# Patient Record
(demographics unavailable — no encounter records)

---

## 2024-11-15 NOTE — HISTORY OF PRESENT ILLNESS
[de-identified] : Ms. Jenny Rosas is 52 year old female with cervical cancer here for consultation. \par  Accompanied by her  Terrell\par  \par  Patient with no PMHx who had irregular periods  over the last year who went to see her GYN Dr. Saucedo and found cervical mass (last GYN visit was 10  yrs ago).  Patient had episodes of vaginal bleeding after her having BM that lasted for a few days, went into urgent care (no vaginal PE) in early 2023. \par  \par  She has 40 lbs intentional weight loss due to diet changes. \par  \par  6/15/2023 \par  A. CERVICAL MASS BIOPSY  :\par  - Superficial fragments of adenocarcinoma with papillary/micropapillary morphology, favor cervical origin.\par  - See comment  \par  Diagnosis Comment \par  There has been intradepartmental review of this case with agreement.\par  The case has been presented during daily GYN consensus meeting on 23.\par  Immunohistochemical stains performed on block A1 and showed tumor cell to be positive for CK7, p16, and mCEA. P53 showed wild type staining. Vimentin, ER and AL are negative. \par  This immunohistochemical staining pattern support the above diagnosis.\par  \par  2023 Pet/CT scan - result\par  Pelvis MRI  - done but not read\par  \par  FHx:\par  Mother with lung cancer (never smoked) at age 58 - \par  P. aunt with breast cancer in her 60s (alive)\par  Father with prostate cancer ins his 60s - alive\par  PGM with multiple myeloma -  \par  Sister with uterine leiomyoma - alive\par  \par  Colonoscopy - never had one\par  mammogram - over due, has another prescriptino\par  \par  Social History\par  Smoked in her 20s for 3-4 yrs\par  Alcohol - denies\par  Illicit drugs - denies\par  Works for  \par  \par  She still has some vaginal spotting. \par  She also has cramping pain on lower pelvic - takes Ibuprofen\par  .\par  \par   [de-identified] : Patient is seen today for follow up   Overall since we last saw patient, patient states she feels "fine." Coccyx pain is still there but manageable. Gets physical therapy x 7 months Saw Dr MACDONALD in July 2024 and now scheduled to see in January 2025 Denies hearing loss, tinnitus Has been having nose bleeds x 6 months. Upon enquiry, mostly on left  Recommend to see ENT

## 2024-11-15 NOTE — BEGINNING OF VISIT
[0] : 2) Feeling down, depressed, or hopeless: Not at all (0) [PHQ-2 Negative] : PHQ-2 Negative [PHQ-9 Negative] : PHQ-9 Negative [Pain Scale: ___] : On a scale of 1-10, today the patient's pain is a(n) [unfilled]. [Patient/Caregiver unclear of wishes] : Patient/Caregiver unclear of wishes [[id number]] : 0

## 2024-11-15 NOTE — ASSESSMENT
[FreeTextEntry1] : ## Cervical Adenocarcinoma p16+ Clinical Stage IIA (MRI)/ IIB (On Exam) PET/CT (23)- 7.0 cm hypermetabolic cervical mass, SUV max 16.2, extending into the upper vaginal canal, consistent with malignancy. No evidence of FDG avid metastatic disease. MRI (2023)- Cervical tumor (6 cm) with bulk of the tumor centered in ectocervix, extending and probably involving upper vagina. Routine limitations of the scan, no parametrial invasion. Suspicious right external iliac node. -hearing test done s/p cisplatin weekly #6 (23 - 23) Radiation (2023 - 2023 - total 25 rounds external and 4 internal 2023 MRI - significant decreased bulk of 3.6 cm cervical mass with suspected upper vaginal invasion. --Decreased size of previously noted suspicious subcm b/l external iliac LNs. 11/15/2023 Pet/Ct  - ZOE 2023 MRI of pelvis -  nonspecific periosseous edema within the abdon coccygeal soft tissue anteriorly, related to post treatment changes.  Patient is here for follow up continues to have coccyx pain - but manageable Labs ordered, drawn in the office, reviewed, analyzed and discussed Hgb, WBC, platelets all back to normal No hearing loss or tinnitus Continue to follow up with Dr. Lomas.  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 She has informed her family   Social Hx Lives in [place], [place] with  ([employer]) and daughter  at [employer] Smoked in her 20s for a few years.  FHx: Mother with lung cancer (never smoked) at age 58 -  P. aunt with breast cancer in her 60s (alive) Father with prostate cancer ins his 60s - alive PGM with multiple myeloma -  Sister with uterine leiomyoma - alive Strongly advised to be uptodate with age appropriate cancer screening  Patient had multiple questions which were answered to satisfaction  Advised patient to monitor with the care team including Gyn Onc, PCP . Parameters including symptoms, signs and labs to monitor discussed at length. Advised to follow up PRN, Contact information given. Patient agrees with complete understanding.

## 2024-11-15 NOTE — BEGINNING OF VISIT
[0] : 2) Feeling down, depressed, or hopeless: Not at all (0) [PHQ-2 Negative] : PHQ-2 Negative [PHQ-9 Negative] : PHQ-9 Negative [Pain Scale: ___] : On a scale of 1-10, today the patient's pain is a(n) [unfilled]. [Patient/Caregiver unclear of wishes] : Patient/Caregiver unclear of wishes [[id number]] : 0

## 2024-11-15 NOTE — HISTORY OF PRESENT ILLNESS
[de-identified] : Ms. Jenny Rosas is 52 year old female with cervical cancer here for consultation. \par  Accompanied by her  Terrell\par  \par  Patient with no PMHx who had irregular periods  over the last year who went to see her GYN Dr. Saucedo and found cervical mass (last GYN visit was 10  yrs ago).  Patient had episodes of vaginal bleeding after her having BM that lasted for a few days, went into urgent care (no vaginal PE) in early 2023. \par  \par  She has 40 lbs intentional weight loss due to diet changes. \par  \par  6/15/2023 \par  A. CERVICAL MASS BIOPSY  :\par  - Superficial fragments of adenocarcinoma with papillary/micropapillary morphology, favor cervical origin.\par  - See comment  \par  Diagnosis Comment \par  There has been intradepartmental review of this case with agreement.\par  The case has been presented during daily GYN consensus meeting on 23.\par  Immunohistochemical stains performed on block A1 and showed tumor cell to be positive for CK7, p16, and mCEA. P53 showed wild type staining. Vimentin, ER and SC are negative. \par  This immunohistochemical staining pattern support the above diagnosis.\par  \par  2023 Pet/CT scan - result\par  Pelvis MRI  - done but not read\par  \par  FHx:\par  Mother with lung cancer (never smoked) at age 58 - \par  P. aunt with breast cancer in her 60s (alive)\par  Father with prostate cancer ins his 60s - alive\par  PGM with multiple myeloma -  \par  Sister with uterine leiomyoma - alive\par  \par  Colonoscopy - never had one\par  mammogram - over due, has another prescriptino\par  \par  Social History\par  Smoked in her 20s for 3-4 yrs\par  Alcohol - denies\par  Illicit drugs - denies\par  Works for  \par  \par  She still has some vaginal spotting. \par  She also has cramping pain on lower pelvic - takes Ibuprofen\par  .\par  \par   [de-identified] : Patient is seen today for follow up   Overall since we last saw patient, patient states she feels "fine." Coccyx pain is still there but manageable. Gets physical therapy x 7 months Saw Dr MACDONALD in July 2024 and now scheduled to see in January 2025 Denies hearing loss, tinnitus Has been having nose bleeds x 6 months. Upon enquiry, mostly on left  Recommend to see ENT

## 2024-11-15 NOTE — ASSESSMENT
[FreeTextEntry1] : ## Cervical Adenocarcinoma p16+ Clinical Stage IIA (MRI)/ IIB (On Exam) PET/CT (23)- 7.0 cm hypermetabolic cervical mass, SUV max 16.2, extending into the upper vaginal canal, consistent with malignancy. No evidence of FDG avid metastatic disease. MRI (2023)- Cervical tumor (6 cm) with bulk of the tumor centered in ectocervix, extending and probably involving upper vagina. Routine limitations of the scan, no parametrial invasion. Suspicious right external iliac node. -hearing test done s/p cisplatin weekly #6 (23 - 23) Radiation (2023 - 2023 - total 25 rounds external and 4 internal 2023 MRI - significant decreased bulk of 3.6 cm cervical mass with suspected upper vaginal invasion. --Decreased size of previously noted suspicious subcm b/l external iliac LNs. 11/15/2023 Pet/Ct  - ZOE 2023 MRI of pelvis -  nonspecific periosseous edema within the abdon coccygeal soft tissue anteriorly, related to post treatment changes.  Patient is here for follow up continues to have coccyx pain - but manageable Labs ordered, drawn in the office, reviewed, analyzed and discussed Hgb, WBC, platelets all back to normal No hearing loss or tinnitus Continue to follow up with Dr. Lomas.  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 She has informed her family   Social Hx Lives in [place], [place] with  ([employer]) and daughter  at [employer] [facility] [employer] Smoked in her 20s for a few years.  FHx: Mother with lung cancer (never smoked) at age 58 -  P. aunt with breast cancer in her 60s (alive) Father with prostate cancer ins his 60s - alive PGM with multiple myeloma -  Sister with uterine leiomyoma - alive Strongly advised to be uptodate with age appropriate cancer screening  Patient had multiple questions which were answered to satisfaction  Advised patient to monitor with the care team including Gyn Onc, PCP . Parameters including symptoms, signs and labs to monitor discussed at length. Advised to follow up PRN, Contact information given. Patient agrees with complete understanding.

## 2024-11-15 NOTE — ASSESSMENT
[FreeTextEntry1] : ## Cervical Adenocarcinoma p16+ Clinical Stage IIA (MRI)/ IIB (On Exam) PET/CT (23)- 7.0 cm hypermetabolic cervical mass, SUV max 16.2, extending into the upper vaginal canal, consistent with malignancy. No evidence of FDG avid metastatic disease. MRI (2023)- Cervical tumor (6 cm) with bulk of the tumor centered in ectocervix, extending and probably involving upper vagina. Routine limitations of the scan, no parametrial invasion. Suspicious right external iliac node. -hearing test done s/p cisplatin weekly #6 (23 - 23) Radiation (2023 - 2023 - total 25 rounds external and 4 internal 2023 MRI - significant decreased bulk of 3.6 cm cervical mass with suspected upper vaginal invasion. --Decreased size of previously noted suspicious subcm b/l external iliac LNs. 11/15/2023 Pet/Ct  - ZOE 2023 MRI of pelvis -  nonspecific periosseous edema within the abdon coccygeal soft tissue anteriorly, related to post treatment changes.  Patient is here for follow up continues to have coccyx pain - but manageable Labs ordered, drawn in the office, reviewed, analyzed and discussed Hgb, WBC, platelets all back to normal No hearing loss or tinnitus Continue to follow up with Dr. Lomas.  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 She has informed her family   Social Hx Lives in [place], [place] with  ([employer]) and daughter  at [employer] [facility] [employer] Smoked in her 20s for a few years.  FHx: Mother with lung cancer (never smoked) at age 58 -  P. aunt with breast cancer in her 60s (alive) Father with prostate cancer ins his 60s - alive PGM with multiple myeloma -  Sister with uterine leiomyoma - alive Strongly advised to be uptodate with age appropriate cancer screening  Patient had multiple questions which were answered to satisfaction  Advised patient to monitor with the care team including Gyn Onc, PCP . Parameters including symptoms, signs and labs to monitor discussed at length. Advised to follow up PRN, Contact information given. Patient agrees with complete understanding.

## 2024-11-15 NOTE — REVIEW OF SYSTEMS
[FreeTextEntry2] : 10 point review of systems negative except as outlined in HPI [FreeTextEntry9] : +low back pain

## 2024-11-15 NOTE — BEGINNING OF VISIT
[0] : 2) Feeling down, depressed, or hopeless: Not at all (0) [PHQ-2 Negative] : PHQ-2 Negative [PHQ-9 Negative] : PHQ-9 Negative [Pain Scale: ___] : On a scale of 1-10, today the patient's pain is a(n) [unfilled]. [Patient/Caregiver unclear of wishes] : Patient/Caregiver unclear of wishes [[id number]] : 0

## 2024-11-15 NOTE — REVIEW OF SYSTEMS
[FreeTextEntry9] : +low back pain [FreeTextEntry2] : 10 point review of systems negative except as outlined in HPI

## 2024-11-15 NOTE — HISTORY OF PRESENT ILLNESS
[de-identified] : Ms. Jenny Rosas is 52 year old female with cervical cancer here for consultation. \par  Accompanied by her  Terrell\par  \par  Patient with no PMHx who had irregular periods  over the last year who went to see her GYN Dr. Saucedo and found cervical mass (last GYN visit was 10  yrs ago).  Patient had episodes of vaginal bleeding after her having BM that lasted for a few days, went into urgent care (no vaginal PE) in early 2023. \par  \par  She has 40 lbs intentional weight loss due to diet changes. \par  \par  6/15/2023 \par  A. CERVICAL MASS BIOPSY  :\par  - Superficial fragments of adenocarcinoma with papillary/micropapillary morphology, favor cervical origin.\par  - See comment  \par  Diagnosis Comment \par  There has been intradepartmental review of this case with agreement.\par  The case has been presented during daily GYN consensus meeting on 23.\par  Immunohistochemical stains performed on block A1 and showed tumor cell to be positive for CK7, p16, and mCEA. P53 showed wild type staining. Vimentin, ER and NE are negative. \par  This immunohistochemical staining pattern support the above diagnosis.\par  \par  2023 Pet/CT scan - result\par  Pelvis MRI  - done but not read\par  \par  FHx:\par  Mother with lung cancer (never smoked) at age 58 - \par  P. aunt with breast cancer in her 60s (alive)\par  Father with prostate cancer ins his 60s - alive\par  PGM with multiple myeloma -  \par  Sister with uterine leiomyoma - alive\par  \par  Colonoscopy - never had one\par  mammogram - over due, has another prescriptino\par  \par  Social History\par  Smoked in her 20s for 3-4 yrs\par  Alcohol - denies\par  Illicit drugs - denies\par  Works for  \par  \par  She still has some vaginal spotting. \par  She also has cramping pain on lower pelvic - takes Ibuprofen\par  .\par  \par   [de-identified] : Patient is seen today for follow up   Overall since we last saw patient, patient states she feels "fine." Coccyx pain is still there but manageable. Gets physical therapy x 7 months Saw Dr MACDONALD in July 2024 and now scheduled to see in January 2025 Denies hearing loss, tinnitus Has been having nose bleeds x 6 months. Upon enquiry, mostly on left  Recommend to see ENT

## 2025-01-09 NOTE — VITALS
[Maximal Pain Intensity: 9/10] : 9/10 [Least Pain Intensity: 0/10] : 0/10 [Pain Duration: ___] : Pain duration: [unfilled] [Pain Location: ___] : Pain Location: [unfilled] [Pain Interferes with ADLs] : Pain interferes with activities of daily living. [OTC] : OTC [90: Able to carry normal activity; minor signs or symptoms of disease.] : 90: Able to carry normal activity; minor signs or symptoms of disease.  [Maximal Pain Intensity: 0/10] : 0/10 [Pain Description/Quality: ___] : Pain description/quality: [unfilled]

## 2025-01-14 NOTE — HISTORY OF PRESENT ILLNESS
[FreeTextEntry1] : Mrs. 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 She is present for her routine follow-up.   Mrs. Rosas  underwent a cervical biopsy 6/15/2023 which demonstrated superficial fragments of adenocarcinoma with papillary/micropapillary morphology, favoring cervical origin. Immunohistochemical stain showed tumor cell to be positive for CK7, p16, and mCEA. P53 showed wild type staining. Vimentin, ER and TN were negative.  PET CT performed on 6/19/23 demonstrated FDG avid uptake in a 7cm cervical mass with an SUV of 16.2 extending into the upper vaginal canal consistent with malignancy. There was no evidence of metastatic disease. There was a subcentimeter nodule in the right middle lobe that was too small to characterize.  MRI of the pelvis was performed on 6/19/23, findings revealed: a T2 intermediate tumor in the cervix with diffusion restriction extending down from internal os to upper vagina, 6.0 cm. Tumor may involve right upper posterior vaginal wall. Within limitation of the scan, no parametrial invasion. LYMPH NODES: Suspicious right posterior external iliac nodes (7, 9), 0.8 x 0.6 cm Nonspecific left posterior external iliac node, 0.8 x 0.5 cm.  Mrs. Rosas was treated with definitive chemoradiation.  She completed radiation to the pelvis to a dose of 4500 cGy and also completed 4 tandem and ring insertions for a total dose of 28Gy on 8/29/23. Mrs. Rosas tolerated treatment well with minimal acute side effects aside from fatigue and urinary irritation.   -PETCT 11/15/23: No evidence of FDG avid malignancy. Mild focal uptake overlying the urethra meatus and vaginal introitus most likely represents urinary contamination. Correlate clinically. MRI 11/15/23:1. Cervical tumor is no longer visualized. 2. No pelvic adenopathy. -MRI 12/19/23: Confluent presacral edema likely related to posttreatment change. Nonspecific periosseous edema within the pericoccygeal soft tissues anteriorly. This may be related to posttreatment changes or osseous stress reaction. Follow-up imaging is recommended to confirm resolution.   Mrs. Rosas is present for her routine 1 year 5 months follow-up s/p completion of radiation therapy.   She denies any vaginal irritation, discharge, bleeding. She reports more recently double voiding and is awaiting UA results from PMD. She denies any bowel symptoms.  She uses the vaginal dilator occasion weekly.  She is sexually active regularly and denies discomfort with intercourse.  She discontinued vaginal estrogen tablets.  She has follow-up scheduled with Dr. Angel Ogden on 1/31/25.  She is currently in PT for lower back discomfort and she feels it is helping her back pain.

## 2025-01-14 NOTE — PHYSICAL EXAM
[Normal] : oriented to person, place and time, the affect was normal, the mood was normal and not anxious [de-identified] : flush cervix, no visible masses, shortened vagina

## 2025-01-14 NOTE — HISTORY OF PRESENT ILLNESS
[FreeTextEntry1] : Mrs. 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 She is present for her routine follow-up.   Mrs. Rosas  underwent a cervical biopsy 6/15/2023 which demonstrated superficial fragments of adenocarcinoma with papillary/micropapillary morphology, favoring cervical origin. Immunohistochemical stain showed tumor cell to be positive for CK7, p16, and mCEA. P53 showed wild type staining. Vimentin, ER and WI were negative.  PET CT performed on 6/19/23 demonstrated FDG avid uptake in a 7cm cervical mass with an SUV of 16.2 extending into the upper vaginal canal consistent with malignancy. There was no evidence of metastatic disease. There was a subcentimeter nodule in the right middle lobe that was too small to characterize.  MRI of the pelvis was performed on 6/19/23, findings revealed: a T2 intermediate tumor in the cervix with diffusion restriction extending down from internal os to upper vagina, 6.0 cm. Tumor may involve right upper posterior vaginal wall. Within limitation of the scan, no parametrial invasion. LYMPH NODES: Suspicious right posterior external iliac nodes (7, 9), 0.8 x 0.6 cm Nonspecific left posterior external iliac node, 0.8 x 0.5 cm.  Mrs. Rosas was treated with definitive chemoradiation.  She completed radiation to the pelvis to a dose of 4500 cGy and also completed 4 tandem and ring insertions for a total dose of 28Gy on 8/29/23. Mrs. Rosas tolerated treatment well with minimal acute side effects aside from fatigue and urinary irritation.   -PETCT 11/15/23: No evidence of FDG avid malignancy. Mild focal uptake overlying the urethra meatus and vaginal introitus most likely represents urinary contamination. Correlate clinically. MRI 11/15/23:1. Cervical tumor is no longer visualized. 2. No pelvic adenopathy. -MRI 12/19/23: Confluent presacral edema likely related to posttreatment change. Nonspecific periosseous edema within the pericoccygeal soft tissues anteriorly. This may be related to posttreatment changes or osseous stress reaction. Follow-up imaging is recommended to confirm resolution.   Mrs. Rosas is present for her routine 1 year 5 months follow-up s/p completion of radiation therapy.   She denies any vaginal irritation, discharge, bleeding. She reports more recently double voiding and is awaiting UA results from PMD. She denies any bowel symptoms.  She uses the vaginal dilator occasion weekly.  She is sexually active regularly and denies discomfort with intercourse.  She discontinued vaginal estrogen tablets.  She has follow-up scheduled with Dr. Angel Ogden on 1/31/25.  She is currently in PT for lower back discomfort and she feels it is helping her back pain.

## 2025-01-14 NOTE — PHYSICAL EXAM
[Normal] : oriented to person, place and time, the affect was normal, the mood was normal and not anxious [de-identified] : flush cervix, no visible masses, shortened vagina

## 2025-01-14 NOTE — HISTORY OF PRESENT ILLNESS
[FreeTextEntry1] : Mrs. 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 She is present for her routine follow-up.   Mrs. Rosas  underwent a cervical biopsy 6/15/2023 which demonstrated superficial fragments of adenocarcinoma with papillary/micropapillary morphology, favoring cervical origin. Immunohistochemical stain showed tumor cell to be positive for CK7, p16, and mCEA. P53 showed wild type staining. Vimentin, ER and LA were negative.  PET CT performed on 6/19/23 demonstrated FDG avid uptake in a 7cm cervical mass with an SUV of 16.2 extending into the upper vaginal canal consistent with malignancy. There was no evidence of metastatic disease. There was a subcentimeter nodule in the right middle lobe that was too small to characterize.  MRI of the pelvis was performed on 6/19/23, findings revealed: a T2 intermediate tumor in the cervix with diffusion restriction extending down from internal os to upper vagina, 6.0 cm. Tumor may involve right upper posterior vaginal wall. Within limitation of the scan, no parametrial invasion. LYMPH NODES: Suspicious right posterior external iliac nodes (7, 9), 0.8 x 0.6 cm Nonspecific left posterior external iliac node, 0.8 x 0.5 cm.  Mrs. Rosas was treated with definitive chemoradiation.  She completed radiation to the pelvis to a dose of 4500 cGy and also completed 4 tandem and ring insertions for a total dose of 28Gy on 8/29/23. Mrs. Rosas tolerated treatment well with minimal acute side effects aside from fatigue and urinary irritation.   -PETCT 11/15/23: No evidence of FDG avid malignancy. Mild focal uptake overlying the urethra meatus and vaginal introitus most likely represents urinary contamination. Correlate clinically. MRI 11/15/23:1. Cervical tumor is no longer visualized. 2. No pelvic adenopathy. -MRI 12/19/23: Confluent presacral edema likely related to posttreatment change. Nonspecific periosseous edema within the pericoccygeal soft tissues anteriorly. This may be related to posttreatment changes or osseous stress reaction. Follow-up imaging is recommended to confirm resolution.   Mrs. Rosas is present for her routine 1 year 5 months follow-up s/p completion of radiation therapy.   She denies any vaginal irritation, discharge, bleeding. She reports more recently double voiding and is awaiting UA results from PMD. She denies any bowel symptoms.  She uses the vaginal dilator occasion weekly.  She is sexually active regularly and denies discomfort with intercourse.  She discontinued vaginal estrogen tablets.  She has follow-up scheduled with Dr. Angel Ogden on 1/31/25.  She is currently in PT for lower back discomfort and she feels it is helping her back pain.

## 2025-01-14 NOTE — REVIEW OF SYSTEMS
[Negative] : Neurological [Hematuria: Grade 0] : Hematuria: Grade 0 [Urinary Tract Pain: Grade 0] : Urinary Tract Pain: Grade 0 [Urinary Urgency: Grade 0] : Urinary Urgency: Grade 0 [Urinary Frequency: Grade 0] : Urinary Frequency: Grade 0 [Vaginal Infection: Grade 0] : Vaginal Infection: Grade 0  [Chest Pain] : no chest pain [Shortness Of Breath] : no shortness of breath [Cough] : no cough [Urinary Frequency] : no change in urinary frequency [Nocturia] : no nocturia [Dysmenorrhea/Abn Vaginal Bleeding] : no dysmenorrhea/abnormal vaginal bleeding [FreeTextEntry8] : + occasional incontinence episodes [FreeTextEntry5] : vaginal dryness

## 2025-01-31 NOTE — HISTORY OF PRESENT ILLNESS
[FreeTextEntry1] : 52yo w/ stage 2B Adenocarcinoma of cervix, for definitive ChemoRT here for follow up  Dx: stage 2B adenocarcinoma of cervix Tx: chemo/RT (7/20/23 -8/25/23) cisplatin ([facility]) and EBRT + Brachytherapy (T&O) ([facility]) completed 8/29/23  Since last visit pt reports doing well. she completed PT for sacral/ coccyx pain and continues to follow with ortho as needed. stats pain is better/ improved. f/u MRI revealed inflammation is improved. She denies pain/fever/bleeding/bloating. She has normal activity tolerance and normal appetite. Reports normal urination and BMs. she is sexually active without issues and she also continues with vaginal dilators. she uses replense as her vaginal moisturizer. recently seen by [facility] and reported transient urinary frequency sxs but now resolved. also reports new nose bleeds 1-2wks ago s/p eval by ENT.    Pelvic MRI 5/7/24: FINDINGS:  UTERUS: Multiple intramural, subserosal, and submucosal uterine fibroids, without interval change from prior studies. The cervix is without significant interval change from prior MRI 11/15/2023. Cervical tumor seen on older studies is not visualized.  ENDOMETRIUM: Normal thickness.  JUNCTIONAL ZONE: Within normal limits  RIGHT OVARY: Normal.  LEFT OVARY: Normal.  ADNEXA: Within normal limits.  BLADDER: Within normal limits.  LYMPH NODES: No pelvic lymphadenopathy.  VISUALIZED PORTIONS:  ABDOMINAL ORGANS: Within normal limits.  BOWEL: Within normal limits.  PERITONEUM: No ascites. Decreased presacral edema compared with MRI 12/19/2023.  VESSELS: Within normal limits.  ABDOMINAL WALL: Within normal limits.  BONES: Within normal limits. IMPRESSION:  Stable examination compared with MRI 11/15/2023. No residual cervical tumor is visualized.  Decreased presacral edema compared with MRI 12/19/2023.  Lumbar MRI 5/7/24: FINDINGS:  LOCALIZER: There is exaggerated thoracic kyphosis and cervical lordosis.  BONES: There is endplate discogenic edema associated with a Schmorl's node along the inferior endplate of L1 anteriorly. There is fatty replacement of L5 and the sacrum status post radiotherapy.  ALIGNMENT: The alignment is normal.  SACROILIAC JOINTS/SACRUM: There is no sacral fracture. The SI joints are partially visualized but are intact.  CONUS AND CAUDA EQUINA: The distal cord and conus are normal in signal. Conus terminates at L1.  VISUALIZED INTRAPELVIC/INTRA-ABDOMINAL SOFT TISSUES: Normal.  PARASPINAL SOFT TISSUES: Normal.  INDIVIDUAL LEVELS:  LOWER THORACIC SPINE: No spinal canal or neuroforaminal stenosis.  T12-L1: No spinal canal or neuroforaminal stenosis.  L1-L2: Mild disc bulging. No spinal canal or neuroforaminal stenosis.  L2-L3: Broad-based disc bulging with right subarticular protrusion. Moderate right and mild left neural foraminal stenosis. Partial effacement of the ventral CSF without central stenosis.  L3-L4: Broad-based disc bulge with ligamentous hypertrophy and mild bilateral neural foraminal stenosis. Mild central stenosis.  L4-L5: Broad-based disc bulge with ligamentous hypertrophy and facet arthropathy. Mild bilateral neural foraminal stenosis. Moderate central stenosis.  L5-S1: No spinal canal or neuroforaminal stenosis. IMPRESSION:  History of coccygeal pain. Of note, the tip of the coccyx was not included on this imaging exam. It was however included on the concurrently performed pelvis MRI, without evidence of postcontrast enhancement of the marrow.  Postradiation fatty changes within the marrow in the lower lumbar spine and sacrum.  Lumbar spondylosis with variable mild/moderate neural foraminal stenosis as above. Mild central canal stenosis at L3-4 and and moderate central stenosis at L4-5.  MRI 12/19/23: IMPRESSION: Confluent presacral edema likely related to posttreatment change. Nonspecific periosseous edema within the pericoccygeal soft tissues anteriorly. This may be related to posttreatment changes or osseous stress reaction. Follow-up imaging is recommended to confirm resolution.  MRI 11/15/23: FINDINGS:  UTERUS: Uterine fibroids. Cervical tumor is no longer visualized.  ENDOMETRIUM: Normal, measuring 2 mm in thickness. A 1.4 cm submucosal fibroid is noted.  JUNCTIONAL ZONE: Normal.  RIGHT OVARY: Normal.  LEFT OVARY: Normal.  ADNEXA: Within normal limits.  BLADDER: Within normal limits.  LYMPH NODES: No pelvic lymphadenopathy.  VISUALIZED PORTIONS:  ABDOMINAL ORGANS: Within normal limits.  BOWEL: Within normal limits.  PERITONEUM: No ascites.  VESSELS: Within normal limits.  ABDOMINAL WALL: Within normal limits.  BONES: Within normal limits. IMPRESSION:  1.  Cervical tumor is no longer visualized.  2.  No pelvic adenopathy.  PETCT 11/15/23: FINDINGS:  Head and Neck: Physiologic uptake is noted in the salivary glands, oropharynx, larynx, and thyroid. No hypermetabolic cervical or supraclavicular lymphadenopathy.  Chest: No hypermetabolic pulmonary nodules. Physiologic uptake is noted in the myocardium. No hypermetabolic mediastinal or axillary lymphadenopathy. No abnormal foci of uptake in the breasts.  Abdomen and Pelvis: Physiologic activity is noted in the liver, spleen, pancreas, bowel, adrenal glands, and urinary collecting system. No hypermetabolic lymphadenopathy. Mild focal uptake overlying the urethra meatus and vaginal introitus, most likely representing urinary contamination. Ovoid well-defined photopenic lesion at the cervix, most likely representing the treated lesion. No abnormal uptake is noted at the cervix. Mild uptake at the uterine fundus most likely represents physiologic endometrial uptake. Fibroid uterus.  Musculoskeletal: No abnormal foci of uptake in the osseous structures. Non-FDG avid sebaceous cyst at the posterior left neck base. Decreased uptake at the sacrum, mostly representing radiation port. IMPRESSION:  1. No evidence of FDG avid malignancy. Mild focal uptake overlying the urethra meatus and vaginal introitus most likely represents urinary contamination. Correlate clinically.  MRI 8/7/23:  FINDINGS:  UTERUS: 9.1 x 5.0 x 6.0 cm. Few fibroids (see reference fibroids below).  *2.7 x 1.8 cm, anterior body, right of midline, intramural (8, 16)  *3.4 x 2.3 cm, right-sided lower uterine segment, subserosal (9, 16)  *1.3 x 1.1 cm, anterior body/fundus, submucosal (less than 50% intramural), (8, 18)  ENDOMETRIUM: Within normal limits.  JUNCTIONAL ZONE: Within normal limits.  CERVIX: Significantly decreased bulk of a 3.6 x 1.8 x 2.0 cm (TV, AP, CC) cervical mass arising exophytically off of the anterior wall of the inferior cervix, previously 6.2 x 4.8 x 3.4 cm. Protrudes into the upper vagina. Suspect vaginal invasion at the anterior fornix. No parametrial invasion.  VAGINA: As above, suspect vaginal invasion at the anterior fornix. Retrospectively stable T2 intermediate signal/T1 hyperintense proteinaceous cyst in the left lateral lower vaginal wall just above the introitus (9, 33)  RIGHT OVARY: Within normal limits.  LEFT OVARY: Within normal limits.  ADNEXA: Within normal limits.  BLADDER: Within normal limits.  LYMPH NODES: Decreased size of previously noted suspicious subcentimeter bilateral posterior external iliac lymph nodes:  *6 x 6 mm right external iliac node (31, 45), previously 8 x 6 mm  *6 x 5 mm left external iliac node (31, 44), previously 9 x 5 mm  VISUALIZED PORTIONS:  ABDOMINAL ORGANS: Within normal limits.  BOWEL: Within normal limits.  PERITONEUM: No ascites.  VESSELS: Within normal limits.  ABDOMINAL WALL: Small fat-containing right inguinal hernia.  BONES: No aggressive osseous lesion. IMPRESSION:  Since 06/19/2023;  *Significantly decreased bulk of a 3.6 cm cervical mass with suspected upper vaginal invasion.  *Decreased size of previously noted suspicious subcentimeter bilateral external iliac lymph nodes.

## 2025-01-31 NOTE — HISTORY OF PRESENT ILLNESS
[FreeTextEntry1] : 52yo w/ stage 2B Adenocarcinoma of cervix, for definitive ChemoRT here for follow up  Dx: stage 2B adenocarcinoma of cervix Tx: chemo/RT (7/20/23 -8/25/23) cisplatin ([facility]) and EBRT + Brachytherapy (T&O) ([facility]) completed 8/29/23  Since last visit pt reports doing well. she completed PT for sacral/ coccyx pain and continues to follow with ortho as needed. stats pain is better/ improved. f/u MRI revealed inflammation is improved. She denies pain/fever/bleeding/bloating. She has normal activity tolerance and normal appetite. Reports normal urination and BMs. she is sexually active without issues and she also continues with vaginal dilators. she uses replense as her vaginal moisturizer. recently seen by [facility] and reported transient urinary frequency sxs but now resolved. also reports new nose bleeds 1-2wks ago s/p eval by ENT.    Pelvic MRI 5/7/24: FINDINGS:  UTERUS: Multiple intramural, subserosal, and submucosal uterine fibroids, without interval change from prior studies. The cervix is without significant interval change from prior MRI 11/15/2023. Cervical tumor seen on older studies is not visualized.  ENDOMETRIUM: Normal thickness.  JUNCTIONAL ZONE: Within normal limits  RIGHT OVARY: Normal.  LEFT OVARY: Normal.  ADNEXA: Within normal limits.  BLADDER: Within normal limits.  LYMPH NODES: No pelvic lymphadenopathy.  VISUALIZED PORTIONS:  ABDOMINAL ORGANS: Within normal limits.  BOWEL: Within normal limits.  PERITONEUM: No ascites. Decreased presacral edema compared with MRI 12/19/2023.  VESSELS: Within normal limits.  ABDOMINAL WALL: Within normal limits.  BONES: Within normal limits. IMPRESSION:  Stable examination compared with MRI 11/15/2023. No residual cervical tumor is visualized.  Decreased presacral edema compared with MRI 12/19/2023.  Lumbar MRI 5/7/24: FINDINGS:  LOCALIZER: There is exaggerated thoracic kyphosis and cervical lordosis.  BONES: There is endplate discogenic edema associated with a Schmorl's node along the inferior endplate of L1 anteriorly. There is fatty replacement of L5 and the sacrum status post radiotherapy.  ALIGNMENT: The alignment is normal.  SACROILIAC JOINTS/SACRUM: There is no sacral fracture. The SI joints are partially visualized but are intact.  CONUS AND CAUDA EQUINA: The distal cord and conus are normal in signal. Conus terminates at L1.  VISUALIZED INTRAPELVIC/INTRA-ABDOMINAL SOFT TISSUES: Normal.  PARASPINAL SOFT TISSUES: Normal.  INDIVIDUAL LEVELS:  LOWER THORACIC SPINE: No spinal canal or neuroforaminal stenosis.  T12-L1: No spinal canal or neuroforaminal stenosis.  L1-L2: Mild disc bulging. No spinal canal or neuroforaminal stenosis.  L2-L3: Broad-based disc bulging with right subarticular protrusion. Moderate right and mild left neural foraminal stenosis. Partial effacement of the ventral CSF without central stenosis.  L3-L4: Broad-based disc bulge with ligamentous hypertrophy and mild bilateral neural foraminal stenosis. Mild central stenosis.  L4-L5: Broad-based disc bulge with ligamentous hypertrophy and facet arthropathy. Mild bilateral neural foraminal stenosis. Moderate central stenosis.  L5-S1: No spinal canal or neuroforaminal stenosis. IMPRESSION:  History of coccygeal pain. Of note, the tip of the coccyx was not included on this imaging exam. It was however included on the concurrently performed pelvis MRI, without evidence of postcontrast enhancement of the marrow.  Postradiation fatty changes within the marrow in the lower lumbar spine and sacrum.  Lumbar spondylosis with variable mild/moderate neural foraminal stenosis as above. Mild central canal stenosis at L3-4 and and moderate central stenosis at L4-5.  MRI 12/19/23: IMPRESSION: Confluent presacral edema likely related to posttreatment change. Nonspecific periosseous edema within the pericoccygeal soft tissues anteriorly. This may be related to posttreatment changes or osseous stress reaction. Follow-up imaging is recommended to confirm resolution.  MRI 11/15/23: FINDINGS:  UTERUS: Uterine fibroids. Cervical tumor is no longer visualized.  ENDOMETRIUM: Normal, measuring 2 mm in thickness. A 1.4 cm submucosal fibroid is noted.  JUNCTIONAL ZONE: Normal.  RIGHT OVARY: Normal.  LEFT OVARY: Normal.  ADNEXA: Within normal limits.  BLADDER: Within normal limits.  LYMPH NODES: No pelvic lymphadenopathy.  VISUALIZED PORTIONS:  ABDOMINAL ORGANS: Within normal limits.  BOWEL: Within normal limits.  PERITONEUM: No ascites.  VESSELS: Within normal limits.  ABDOMINAL WALL: Within normal limits.  BONES: Within normal limits. IMPRESSION:  1.  Cervical tumor is no longer visualized.  2.  No pelvic adenopathy.  PETCT 11/15/23: FINDINGS:  Head and Neck: Physiologic uptake is noted in the salivary glands, oropharynx, larynx, and thyroid. No hypermetabolic cervical or supraclavicular lymphadenopathy.  Chest: No hypermetabolic pulmonary nodules. Physiologic uptake is noted in the myocardium. No hypermetabolic mediastinal or axillary lymphadenopathy. No abnormal foci of uptake in the breasts.  Abdomen and Pelvis: Physiologic activity is noted in the liver, spleen, pancreas, bowel, adrenal glands, and urinary collecting system. No hypermetabolic lymphadenopathy. Mild focal uptake overlying the urethra meatus and vaginal introitus, most likely representing urinary contamination. Ovoid well-defined photopenic lesion at the cervix, most likely representing the treated lesion. No abnormal uptake is noted at the cervix. Mild uptake at the uterine fundus most likely represents physiologic endometrial uptake. Fibroid uterus.  Musculoskeletal: No abnormal foci of uptake in the osseous structures. Non-FDG avid sebaceous cyst at the posterior left neck base. Decreased uptake at the sacrum, mostly representing radiation port. IMPRESSION:  1. No evidence of FDG avid malignancy. Mild focal uptake overlying the urethra meatus and vaginal introitus most likely represents urinary contamination. Correlate clinically.  MRI 8/7/23:  FINDINGS:  UTERUS: 9.1 x 5.0 x 6.0 cm. Few fibroids (see reference fibroids below).  *2.7 x 1.8 cm, anterior body, right of midline, intramural (8, 16)  *3.4 x 2.3 cm, right-sided lower uterine segment, subserosal (9, 16)  *1.3 x 1.1 cm, anterior body/fundus, submucosal (less than 50% intramural), (8, 18)  ENDOMETRIUM: Within normal limits.  JUNCTIONAL ZONE: Within normal limits.  CERVIX: Significantly decreased bulk of a 3.6 x 1.8 x 2.0 cm (TV, AP, CC) cervical mass arising exophytically off of the anterior wall of the inferior cervix, previously 6.2 x 4.8 x 3.4 cm. Protrudes into the upper vagina. Suspect vaginal invasion at the anterior fornix. No parametrial invasion.  VAGINA: As above, suspect vaginal invasion at the anterior fornix. Retrospectively stable T2 intermediate signal/T1 hyperintense proteinaceous cyst in the left lateral lower vaginal wall just above the introitus (9, 33)  RIGHT OVARY: Within normal limits.  LEFT OVARY: Within normal limits.  ADNEXA: Within normal limits.  BLADDER: Within normal limits.  LYMPH NODES: Decreased size of previously noted suspicious subcentimeter bilateral posterior external iliac lymph nodes:  *6 x 6 mm right external iliac node (31, 45), previously 8 x 6 mm  *6 x 5 mm left external iliac node (31, 44), previously 9 x 5 mm  VISUALIZED PORTIONS:  ABDOMINAL ORGANS: Within normal limits.  BOWEL: Within normal limits.  PERITONEUM: No ascites.  VESSELS: Within normal limits.  ABDOMINAL WALL: Small fat-containing right inguinal hernia.  BONES: No aggressive osseous lesion. IMPRESSION:  Since 06/19/2023;  *Significantly decreased bulk of a 3.6 cm cervical mass with suspected upper vaginal invasion.  *Decreased size of previously noted suspicious subcentimeter bilateral external iliac lymph nodes.

## 2025-01-31 NOTE — DISCUSSION/SUMMARY
[Reviewed Clinical Lab Test(s)] : Results of clinical tests were reviewed. [Reviewed Radiology Report(s)] : Radiology reports were reviewed. [Discuss Alternatives/Risks/Benefits w/Patient] : All alternatives, risks, and benefits were discussed with the patient/family and all questions were answered.  Patient expressed good understanding and appreciates the importance of follow up as recommended. [Visit Time ___ Minutes] : [unfilled] minutes [Face to Face Time___ Minutes] : with [unfilled] minutes in face to face consultation. [FreeTextEntry1] : 52yo w/ stage 2B Adenocarcinoma of cervix, s/p chemoRT with EBRT and brachytherapy with T&O, completed treatment 8/29/23. DFI 1yr 5mos. Pt with coccxy/sacral pain related to RT which is improved/resolved. ZOE On exam today.  -more than 50% of visit spent face to face with patient counseling and coordinating care with high level complexity -reviewed diagnosis, stage, treatment course to date. reviewed imaging is ZOE. reviewed plan for continued surveillance. reviewed si/sxs of recurrence. reviewed role of imaging as needed based on exam and pt sxs.  -pt to continue vaginal dilators and moisturizers -reviewed RT cystitis sxs and eval as needed -coccyx/ sacral pain- improved/ resovled -rtc 6 months and then transition to yearly visits at [facility] 2yrs -pain/fever/bleeding precautions given

## 2025-07-09 NOTE — VITALS
[Maximal Pain Intensity: 0/10] : 0/10 [Least Pain Intensity: 0/10] : 0/10 [Pain Description/Quality: ___] : Pain description/quality: [unfilled] [Pain Interferes with ADLs] : Pain interferes with activities of daily living. [OTC] : OTC [90: Able to carry normal activity; minor signs or symptoms of disease.] : 90: Able to carry normal activity; minor signs or symptoms of disease.

## 2025-07-10 NOTE — PHYSICAL EXAM
[Normal] : oriented to person, place and time, the affect was normal, the mood was normal and not anxious [de-identified] : flush cervix, no visible masses, shortened vagina

## 2025-07-10 NOTE — PHYSICAL EXAM
[Normal] : oriented to person, place and time, the affect was normal, the mood was normal and not anxious [de-identified] : flush cervix, no visible masses, shortened vagina

## 2025-07-10 NOTE — HISTORY OF PRESENT ILLNESS
[FreeTextEntry1] : Mrs. 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 She is present for her routine follow-up.   Mrs. Rosas  underwent a cervical biopsy 6/15/2023 which demonstrated superficial fragments of adenocarcinoma with papillary/micropapillary morphology, favoring cervical origin. Immunohistochemical stain showed tumor cell to be positive for CK7, p16, and mCEA. P53 showed wild type staining. Vimentin, ER and ND were negative.  PET CT performed on 6/19/23 demonstrated FDG avid uptake in a 7cm cervical mass with an SUV of 16.2 extending into the upper vaginal canal consistent with malignancy. There was no evidence of metastatic disease. There was a subcentimeter nodule in the right middle lobe that was too small to characterize.  MRI of the pelvis was performed on 6/19/23, findings revealed: a T2 intermediate tumor in the cervix with diffusion restriction extending down from internal os to upper vagina, 6.0 cm. Tumor may involve right upper posterior vaginal wall. Within limitation of the scan, no parametrial invasion. LYMPH NODES: Suspicious right posterior external iliac nodes (7, 9), 0.8 x 0.6 cm Nonspecific left posterior external iliac node, 0.8 x 0.5 cm.  Mrs. Rosas was treated with definitive chemoradiation.  She completed radiation to the pelvis to a dose of 4500 cGy and also completed 4 tandem and ring insertions for a total dose of 28Gy on 8/29/23. Mrs. Rosas tolerated treatment well with minimal acute side effects aside from fatigue and urinary irritation.   -PETCT 11/15/23: No evidence of FDG avid malignancy. Mild focal uptake overlying the urethra meatus and vaginal introitus most likely represents urinary contamination. Correlate clinically. MRI 11/15/23:1. Cervical tumor is no longer visualized. 2. No pelvic adenopathy. -MRI 12/19/23: Confluent presacral edema likely related to posttreatment change. Nonspecific periosseous edema within the pericoccygeal soft tissues anteriorly. This may be related to posttreatment changes or osseous stress reaction. Follow-up imaging is recommended to confirm resolution.   Mrs. Rosas is present for her routine 1 year 5 months follow-up s/p completion of radiation therapy.  She denies any vaginal irritation, discharge, bleeding. She reports more recently double voiding and is awaiting UA results from PMD. She denies any bowel symptoms.  She uses the vaginal dilator occasion weekly.  She is sexually active regularly and denies discomfort with intercourse.  She discontinued vaginal estrogen tablets.  She has follow-up scheduled with Dr. Angel Ogden on 1/31/25. She is currently in PT for lower back discomfort, and she feels it is helping her back pain.   7/10/25 - F/U Mrs. Rosas is present for her routine follow-up almost 2 years s/p completion of radiation therapy.  She denies any vaginal irritation, discharge, bleeding. She denies any bowel changes. She is using the vaginal dilator weekly. She has follow-up by Dr. Dias in August. Overall, she is feeling well.

## 2025-07-10 NOTE — HISTORY OF PRESENT ILLNESS
[FreeTextEntry1] : Mrs. 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 She is present for her routine follow-up.   Mrs. Rosas  underwent a cervical biopsy 6/15/2023 which demonstrated superficial fragments of adenocarcinoma with papillary/micropapillary morphology, favoring cervical origin. Immunohistochemical stain showed tumor cell to be positive for CK7, p16, and mCEA. P53 showed wild type staining. Vimentin, ER and NJ were negative.  PET CT performed on 6/19/23 demonstrated FDG avid uptake in a 7cm cervical mass with an SUV of 16.2 extending into the upper vaginal canal consistent with malignancy. There was no evidence of metastatic disease. There was a subcentimeter nodule in the right middle lobe that was too small to characterize.  MRI of the pelvis was performed on 6/19/23, findings revealed: a T2 intermediate tumor in the cervix with diffusion restriction extending down from internal os to upper vagina, 6.0 cm. Tumor may involve right upper posterior vaginal wall. Within limitation of the scan, no parametrial invasion. LYMPH NODES: Suspicious right posterior external iliac nodes (7, 9), 0.8 x 0.6 cm Nonspecific left posterior external iliac node, 0.8 x 0.5 cm.  Mrs. Rosas was treated with definitive chemoradiation.  She completed radiation to the pelvis to a dose of 4500 cGy and also completed 4 tandem and ring insertions for a total dose of 28Gy on 8/29/23. Mrs. Rosas tolerated treatment well with minimal acute side effects aside from fatigue and urinary irritation.   -PETCT 11/15/23: No evidence of FDG avid malignancy. Mild focal uptake overlying the urethra meatus and vaginal introitus most likely represents urinary contamination. Correlate clinically. MRI 11/15/23:1. Cervical tumor is no longer visualized. 2. No pelvic adenopathy. -MRI 12/19/23: Confluent presacral edema likely related to posttreatment change. Nonspecific periosseous edema within the pericoccygeal soft tissues anteriorly. This may be related to posttreatment changes or osseous stress reaction. Follow-up imaging is recommended to confirm resolution.   Mrs. Rosas is present for her routine 1 year 5 months follow-up s/p completion of radiation therapy.  She denies any vaginal irritation, discharge, bleeding. She reports more recently double voiding and is awaiting UA results from PMD. She denies any bowel symptoms.  She uses the vaginal dilator occasion weekly.  She is sexually active regularly and denies discomfort with intercourse.  She discontinued vaginal estrogen tablets.  She has follow-up scheduled with Dr. Angel Ogden on 1/31/25. She is currently in PT for lower back discomfort, and she feels it is helping her back pain.   7/10/25 - F/U Mrs. Rosas is present for her routine follow-up almost 2 years s/p completion of radiation therapy.  She denies any vaginal irritation, discharge, bleeding. She denies any bowel changes. She is using the vaginal dilator weekly. She has follow-up by Dr. Dias in August. Overall, she is feeling well.

## 2025-07-29 NOTE — HISTORY OF PRESENT ILLNESS
[FreeTextEntry1] : 53yo w/ stage 2B Adenocarcinoma of cervix, for definitive ChemoRT here for follow up  Dx: stage 2B adenocarcinoma of cervix Tx: chemo/RT (7/20/23 -8/25/23) cisplatin ([facility]rit) and EBRT + Brachytherapy (T&O) ([facility]) completed 8/29/23  Since last visit pt reports doing well. she completed PT for sacral/ coccyx pain and continues to follow with ortho as needed. stats pain is better/ improved. f/u MRI revealed inflammation is improved. She denies pain/fever/bleeding/bloating. She has normal activity tolerance and normal appetite. Reports normal urination and BMs. she is sexually active without issues and she also continues with vaginal dilators. she uses replense as her vaginal moisturizer. recently seen by [facility] and reported transient urinary frequency sxs but now resolved. also reports new nose bleeds 1-2wks ago s/p eval by ENT.    Pelvic MRI 5/7/24: FINDINGS:  UTERUS: Multiple intramural, subserosal, and submucosal uterine fibroids, without interval change from prior studies. The cervix is without significant interval change from prior MRI 11/15/2023. Cervical tumor seen on older studies is not visualized.  ENDOMETRIUM: Normal thickness.  JUNCTIONAL ZONE: Within normal limits  RIGHT OVARY: Normal.  LEFT OVARY: Normal.  ADNEXA: Within normal limits.  BLADDER: Within normal limits.  LYMPH NODES: No pelvic lymphadenopathy.  VISUALIZED PORTIONS:  ABDOMINAL ORGANS: Within normal limits.  BOWEL: Within normal limits.  PERITONEUM: No ascites. Decreased presacral edema compared with MRI 12/19/2023.  VESSELS: Within normal limits.  ABDOMINAL WALL: Within normal limits.  BONES: Within normal limits. IMPRESSION:  Stable examination compared with MRI 11/15/2023. No residual cervical tumor is visualized.  Decreased presacral edema compared with MRI 12/19/2023.  Lumbar MRI 5/7/24: FINDINGS:  LOCALIZER: There is exaggerated thoracic kyphosis and cervical lordosis.  BONES: There is endplate discogenic edema associated with a Schmorl's node along the inferior endplate of L1 anteriorly. There is fatty replacement of L5 and the sacrum status post radiotherapy.  ALIGNMENT: The alignment is normal.  SACROILIAC JOINTS/SACRUM: There is no sacral fracture. The SI joints are partially visualized but are intact.  CONUS AND CAUDA EQUINA: The distal cord and conus are normal in signal. Conus terminates at L1.  VISUALIZED INTRAPELVIC/INTRA-ABDOMINAL SOFT TISSUES: Normal.  PARASPINAL SOFT TISSUES: Normal.  INDIVIDUAL LEVELS:  LOWER THORACIC SPINE: No spinal canal or neuroforaminal stenosis.  T12-L1: No spinal canal or neuroforaminal stenosis.  L1-L2: Mild disc bulging. No spinal canal or neuroforaminal stenosis.  L2-L3: Broad-based disc bulging with right subarticular protrusion. Moderate right and mild left neural foraminal stenosis. Partial effacement of the ventral CSF without central stenosis.  L3-L4: Broad-based disc bulge with ligamentous hypertrophy and mild bilateral neural foraminal stenosis. Mild central stenosis.  L4-L5: Broad-based disc bulge with ligamentous hypertrophy and facet arthropathy. Mild bilateral neural foraminal stenosis. Moderate central stenosis.  L5-S1: No spinal canal or neuroforaminal stenosis. IMPRESSION:  History of coccygeal pain. Of note, the tip of the coccyx was not included on this imaging exam. It was however included on the concurrently performed pelvis MRI, without evidence of postcontrast enhancement of the marrow.  Postradiation fatty changes within the marrow in the lower lumbar spine and sacrum.  Lumbar spondylosis with variable mild/moderate neural foraminal stenosis as above. Mild central canal stenosis at L3-4 and and moderate central stenosis at L4-5.  MRI 12/19/23: IMPRESSION: Confluent presacral edema likely related to posttreatment change. Nonspecific periosseous edema within the pericoccygeal soft tissues anteriorly. This may be related to posttreatment changes or osseous stress reaction. Follow-up imaging is recommended to confirm resolution.  MRI 11/15/23: FINDINGS:  UTERUS: Uterine fibroids. Cervical tumor is no longer visualized.  ENDOMETRIUM: Normal, measuring 2 mm in thickness. A 1.4 cm submucosal fibroid is noted.  JUNCTIONAL ZONE: Normal.  RIGHT OVARY: Normal.  LEFT OVARY: Normal.  ADNEXA: Within normal limits.  BLADDER: Within normal limits.  LYMPH NODES: No pelvic lymphadenopathy.  VISUALIZED PORTIONS:  ABDOMINAL ORGANS: Within normal limits.  BOWEL: Within normal limits.  PERITONEUM: No ascites.  VESSELS: Within normal limits.  ABDOMINAL WALL: Within normal limits.  BONES: Within normal limits. IMPRESSION:  1.  Cervical tumor is no longer visualized.  2.  No pelvic adenopathy.  PETCT 11/15/23: FINDINGS:  Head and Neck: Physiologic uptake is noted in the salivary glands, oropharynx, larynx, and thyroid. No hypermetabolic cervical or supraclavicular lymphadenopathy.  Chest: No hypermetabolic pulmonary nodules. Physiologic uptake is noted in the myocardium. No hypermetabolic mediastinal or axillary lymphadenopathy. No abnormal foci of uptake in the breasts.  Abdomen and Pelvis: Physiologic activity is noted in the liver, spleen, pancreas, bowel, adrenal glands, and urinary collecting system. No hypermetabolic lymphadenopathy. Mild focal uptake overlying the urethra meatus and vaginal introitus, most likely representing urinary contamination. Ovoid well-defined photopenic lesion at the cervix, most likely representing the treated lesion. No abnormal uptake is noted at the cervix. Mild uptake at the uterine fundus most likely represents physiologic endometrial uptake. Fibroid uterus.  Musculoskeletal: No abnormal foci of uptake in the osseous structures. Non-FDG avid sebaceous cyst at the posterior left neck base. Decreased uptake at the sacrum, mostly representing radiation port. IMPRESSION:  1. No evidence of FDG avid malignancy. Mild focal uptake overlying the urethra meatus and vaginal introitus most likely represents urinary contamination. Correlate clinically.  MRI 8/7/23:  FINDINGS:  UTERUS: 9.1 x 5.0 x 6.0 cm. Few fibroids (see reference fibroids below).  *2.7 x 1.8 cm, anterior body, right of midline, intramural (8, 16)  *3.4 x 2.3 cm, right-sided lower uterine segment, subserosal (9, 16)  *1.3 x 1.1 cm, anterior body/fundus, submucosal (less than 50% intramural), (8, 18)  ENDOMETRIUM: Within normal limits.  JUNCTIONAL ZONE: Within normal limits.  CERVIX: Significantly decreased bulk of a 3.6 x 1.8 x 2.0 cm (TV, AP, CC) cervical mass arising exophytically off of the anterior wall of the inferior cervix, previously 6.2 x 4.8 x 3.4 cm. Protrudes into the upper vagina. Suspect vaginal invasion at the anterior fornix. No parametrial invasion.  VAGINA: As above, suspect vaginal invasion at the anterior fornix. Retrospectively stable T2 intermediate signal/T1 hyperintense proteinaceous cyst in the left lateral lower vaginal wall just above the introitus (9, 33)  RIGHT OVARY: Within normal limits.  LEFT OVARY: Within normal limits.  ADNEXA: Within normal limits.  BLADDER: Within normal limits.  LYMPH NODES: Decreased size of previously noted suspicious subcentimeter bilateral posterior external iliac lymph nodes:  *6 x 6 mm right external iliac node (31, 45), previously 8 x 6 mm  *6 x 5 mm left external iliac node (31, 44), previously 9 x 5 mm  VISUALIZED PORTIONS:  ABDOMINAL ORGANS: Within normal limits.  BOWEL: Within normal limits.  PERITONEUM: No ascites.  VESSELS: Within normal limits.  ABDOMINAL WALL: Small fat-containing right inguinal hernia.  BONES: No aggressive osseous lesion. IMPRESSION:  Since 06/19/2023;  *Significantly decreased bulk of a 3.6 cm cervical mass with suspected upper vaginal invasion.  *Decreased size of previously noted suspicious subcentimeter bilateral external iliac lymph nodes.

## 2025-07-29 NOTE — DISCUSSION/SUMMARY
[Reviewed Clinical Lab Test(s)] : Results of clinical tests were reviewed. [Reviewed Radiology Report(s)] : Radiology reports were reviewed. [Discuss Alternatives/Risks/Benefits w/Patient] : All alternatives, risks, and benefits were discussed with the patient/family and all questions were answered.  Patient expressed good understanding and appreciates the importance of follow up as recommended. [Visit Time ___ Minutes] : [unfilled] minutes [Face to Face Time___ Minutes] : with [unfilled] minutes in face to face consultation. [FreeTextEntry1] : 55yo w/ stage 2B Adenocarcinoma of cervix, s/p chemoRT with EBRT and brachytherapy with T&O, completed treatment 8/29/23. D[facility] 1yr 5mos. Pt with coccxy/sacral pain related to RT which is improved/resolved. ZOE On exam today.  -more than 50% of visit spent face to face with patient counseling and coordinating care with high level complexity -reviewed diagnosis, stage, treatment course to date. reviewed imaging is ZOE. reviewed plan for continued surveillance. reviewed si/sxs of recurrence. reviewed role of imaging as needed based on exam and pt sxs.  -pt to continue vaginal dilators and moisturizers -reviewed RT cystitis sxs and eval as needed -coccyx/ sacral pain- improved/ resovled -rtc 6 months and then transition to yearly visits at [facility] 2yrs -pain/fever/bleeding precautions given

## 2025-07-29 NOTE — HISTORY OF PRESENT ILLNESS
[FreeTextEntry1] : 55yo w/ stage 2B Adenocarcinoma of cervix, for definitive ChemoRT here for follow up  Dx: stage 2B adenocarcinoma of cervix Tx: chemo/RT (7/20/23 -8/25/23) cisplatin ([facility]rit) and EBRT + Brachytherapy (T&O) (Mike) completed 8/29/23  Since last visit pt reports doing well. she completed PT for sacral/ coccyx pain and continues to follow with ortho as needed. stats pain is better/ improved. f/u MRI revealed inflammation is improved. She denies pain/fever/bleeding/bloating. She has normal activity tolerance and normal appetite. Reports normal urination and BMs. she is sexually active without issues and she also continues with vaginal dilators. she uses replense as her vaginal moisturizer. recently seen by [facility] and reported transient urinary frequency sxs but now resolved. also reports new nose bleeds 1-2wks ago s/p eval by ENT.    Pelvic MRI 5/7/24: FINDINGS:  UTERUS: Multiple intramural, subserosal, and submucosal uterine fibroids, without interval change from prior studies. The cervix is without significant interval change from prior MRI 11/15/2023. Cervical tumor seen on older studies is not visualized.  ENDOMETRIUM: Normal thickness.  JUNCTIONAL ZONE: Within normal limits  RIGHT OVARY: Normal.  LEFT OVARY: Normal.  ADNEXA: Within normal limits.  BLADDER: Within normal limits.  LYMPH NODES: No pelvic lymphadenopathy.  VISUALIZED PORTIONS:  ABDOMINAL ORGANS: Within normal limits.  BOWEL: Within normal limits.  PERITONEUM: No ascites. Decreased presacral edema compared with MRI 12/19/2023.  VESSELS: Within normal limits.  ABDOMINAL WALL: Within normal limits.  BONES: Within normal limits. IMPRESSION:  Stable examination compared with MRI 11/15/2023. No residual cervical tumor is visualized.  Decreased presacral edema compared with MRI 12/19/2023.  Lumbar MRI 5/7/24: FINDINGS:  LOCALIZER: There is exaggerated thoracic kyphosis and cervical lordosis.  BONES: There is endplate discogenic edema associated with a Schmorl's node along the inferior endplate of L1 anteriorly. There is fatty replacement of L5 and the sacrum status post radiotherapy.  ALIGNMENT: The alignment is normal.  SACROILIAC JOINTS/SACRUM: There is no sacral fracture. The SI joints are partially visualized but are intact.  CONUS AND CAUDA EQUINA: The distal cord and conus are normal in signal. Conus terminates at L1.  VISUALIZED INTRAPELVIC/INTRA-ABDOMINAL SOFT TISSUES: Normal.  PARASPINAL SOFT TISSUES: Normal.  INDIVIDUAL LEVELS:  LOWER THORACIC SPINE: No spinal canal or neuroforaminal stenosis.  T12-L1: No spinal canal or neuroforaminal stenosis.  L1-L2: Mild disc bulging. No spinal canal or neuroforaminal stenosis.  L2-L3: Broad-based disc bulging with right subarticular protrusion. Moderate right and mild left neural foraminal stenosis. Partial effacement of the ventral CSF without central stenosis.  L3-L4: Broad-based disc bulge with ligamentous hypertrophy and mild bilateral neural foraminal stenosis. Mild central stenosis.  L4-L5: Broad-based disc bulge with ligamentous hypertrophy and facet arthropathy. Mild bilateral neural foraminal stenosis. Moderate central stenosis.  L5-S1: No spinal canal or neuroforaminal stenosis. IMPRESSION:  History of coccygeal pain. Of note, the tip of the coccyx was not included on this imaging exam. It was however included on the concurrently performed pelvis MRI, without evidence of postcontrast enhancement of the marrow.  Postradiation fatty changes within the marrow in the lower lumbar spine and sacrum.  Lumbar spondylosis with variable mild/moderate neural foraminal stenosis as above. Mild central canal stenosis at L3-4 and and moderate central stenosis at L4-5.  MRI 12/19/23: IMPRESSION: Confluent presacral edema likely related to posttreatment change. Nonspecific periosseous edema within the pericoccygeal soft tissues anteriorly. This may be related to posttreatment changes or osseous stress reaction. Follow-up imaging is recommended to confirm resolution.  MRI 11/15/23: FINDINGS:  UTERUS: Uterine fibroids. Cervical tumor is no longer visualized.  ENDOMETRIUM: Normal, measuring 2 mm in thickness. A 1.4 cm submucosal fibroid is noted.  JUNCTIONAL ZONE: Normal.  RIGHT OVARY: Normal.  LEFT OVARY: Normal.  ADNEXA: Within normal limits.  BLADDER: Within normal limits.  LYMPH NODES: No pelvic lymphadenopathy.  VISUALIZED PORTIONS:  ABDOMINAL ORGANS: Within normal limits.  BOWEL: Within normal limits.  PERITONEUM: No ascites.  VESSELS: Within normal limits.  ABDOMINAL WALL: Within normal limits.  BONES: Within normal limits. IMPRESSION:  1.  Cervical tumor is no longer visualized.  2.  No pelvic adenopathy.  PETCT 11/15/23: FINDINGS:  Head and Neck: Physiologic uptake is noted in the salivary glands, oropharynx, larynx, and thyroid. No hypermetabolic cervical or supraclavicular lymphadenopathy.  Chest: No hypermetabolic pulmonary nodules. Physiologic uptake is noted in the myocardium. No hypermetabolic mediastinal or axillary lymphadenopathy. No abnormal foci of uptake in the breasts.  Abdomen and Pelvis: Physiologic activity is noted in the liver, spleen, pancreas, bowel, adrenal glands, and urinary collecting system. No hypermetabolic lymphadenopathy. Mild focal uptake overlying the urethra meatus and vaginal introitus, most likely representing urinary contamination. Ovoid well-defined photopenic lesion at the cervix, most likely representing the treated lesion. No abnormal uptake is noted at the cervix. Mild uptake at the uterine fundus most likely represents physiologic endometrial uptake. Fibroid uterus.  Musculoskeletal: No abnormal foci of uptake in the osseous structures. Non-FDG avid sebaceous cyst at the posterior left neck base. Decreased uptake at the sacrum, mostly representing radiation port. IMPRESSION:  1. No evidence of FDG avid malignancy. Mild focal uptake overlying the urethra meatus and vaginal introitus most likely represents urinary contamination. Correlate clinically.  MRI 8/7/23:  FINDINGS:  UTERUS: 9.1 x 5.0 x 6.0 cm. Few fibroids (see reference fibroids below).  *2.7 x 1.8 cm, anterior body, right of midline, intramural (8, 16)  *3.4 x 2.3 cm, right-sided lower uterine segment, subserosal (9, 16)  *1.3 x 1.1 cm, anterior body/fundus, submucosal (less than 50% intramural), (8, 18)  ENDOMETRIUM: Within normal limits.  JUNCTIONAL ZONE: Within normal limits.  CERVIX: Significantly decreased bulk of a 3.6 x 1.8 x 2.0 cm (TV, AP, CC) cervical mass arising exophytically off of the anterior wall of the inferior cervix, previously 6.2 x 4.8 x 3.4 cm. Protrudes into the upper vagina. Suspect vaginal invasion at the anterior fornix. No parametrial invasion.  VAGINA: As above, suspect vaginal invasion at the anterior fornix. Retrospectively stable T2 intermediate signal/T1 hyperintense proteinaceous cyst in the left lateral lower vaginal wall just above the introitus (9, 33)  RIGHT OVARY: Within normal limits.  LEFT OVARY: Within normal limits.  ADNEXA: Within normal limits.  BLADDER: Within normal limits.  LYMPH NODES: Decreased size of previously noted suspicious subcentimeter bilateral posterior external iliac lymph nodes:  *6 x 6 mm right external iliac node (31, 45), previously 8 x 6 mm  *6 x 5 mm left external iliac node (31, 44), previously 9 x 5 mm  VISUALIZED PORTIONS:  ABDOMINAL ORGANS: Within normal limits.  BOWEL: Within normal limits.  PERITONEUM: No ascites.  VESSELS: Within normal limits.  ABDOMINAL WALL: Small fat-containing right inguinal hernia.  BONES: No aggressive osseous lesion. IMPRESSION:  Since 06/19/2023;  *Significantly decreased bulk of a 3.6 cm cervical mass with suspected upper vaginal invasion.  *Decreased size of previously noted suspicious subcentimeter bilateral external iliac lymph nodes.